# Patient Record
Sex: FEMALE | URBAN - METROPOLITAN AREA
[De-identification: names, ages, dates, MRNs, and addresses within clinical notes are randomized per-mention and may not be internally consistent; named-entity substitution may affect disease eponyms.]

---

## 2022-11-14 ENCOUNTER — TRANSCRIBE ORDER (OUTPATIENT)
Dept: SCHEDULING | Age: 57
End: 2022-11-14

## 2022-11-14 DIAGNOSIS — R07.81 RIB PAIN ON LEFT SIDE: Primary | ICD-10-CM

## 2022-11-14 DIAGNOSIS — Z90.10 H/O MASTECTOMY: ICD-10-CM

## 2023-02-06 ENCOUNTER — TELEPHONE (OUTPATIENT)
Dept: INTERNAL MEDICINE CLINIC | Age: 58
End: 2023-02-06

## 2023-02-06 NOTE — TELEPHONE ENCOUNTER
----- Message from Caitlyn Coleman sent at 2/3/2023  2:37 PM EST -----  Subject: Appointment Request    Reason for Call: New Patient/New to Provider Appointment needed: New   Patient Request Appointment    QUESTIONS    Reason for appointment request? Requested Provider unavailable - Laura Pardeep     Additional Information for Provider? Patient was recommended to Dr. Estevan Black by   her cardiologist, Zenaida Key. She wants to establish with her. Would   she take her as a new patient? When could she come in?  Please advise.  ---------------------------------------------------------------------------  --------------  Uriel KING  [phone]; OK to leave message on voicemail  ---------------------------------------------------------------------------  --------------  SCRIPT ANSWERS  COVID Screen: Ramiro Burnett

## 2023-02-08 ENCOUNTER — OFFICE VISIT (OUTPATIENT)
Dept: SURGERY | Age: 58
End: 2023-02-08

## 2023-02-08 VITALS
BODY MASS INDEX: 19.86 KG/M2 | RESPIRATION RATE: 16 BRPM | SYSTOLIC BLOOD PRESSURE: 124 MMHG | OXYGEN SATURATION: 96 % | TEMPERATURE: 98.8 F | WEIGHT: 119.2 LBS | HEART RATE: 88 BPM | DIASTOLIC BLOOD PRESSURE: 77 MMHG | HEIGHT: 65 IN

## 2023-02-08 DIAGNOSIS — K43.9 EPIGASTRIC HERNIA: Primary | ICD-10-CM

## 2023-02-08 PROCEDURE — 99202 OFFICE O/P NEW SF 15 MIN: CPT | Performed by: SURGERY

## 2023-02-08 RX ORDER — ROSUVASTATIN CALCIUM 5 MG/1
TABLET, COATED ORAL
COMMUNITY
Start: 2022-08-05

## 2023-02-08 RX ORDER — GUAIFENESIN 100 MG/5ML
81 LIQUID (ML) ORAL DAILY
COMMUNITY

## 2023-02-08 RX ORDER — PAROXETINE 10 MG/1
TABLET, FILM COATED ORAL
COMMUNITY
Start: 2022-12-31

## 2023-02-08 NOTE — LETTER
2/8/2023    Patient: Eileen Carlton   YOB: 1965   Date of Visit: 2/8/2023     Dg Lawton MD  [address]  [address] [facility] [address]  Via Fax: [phone]    Dear Dg Lawton MD,      Thank you for referring Ms. Bebeto Barrios to [facility] for evaluation. My notes for this consultation are attached. If you have questions, please do not hesitate to call me. I look forward to following your patient along with you.       Sincerely,    Ferdinand Ormond, MD

## 2023-02-08 NOTE — PROGRESS NOTES
Identified pt with two pt identifiers (name and ). Reviewed chart in preparation for visit and have obtained necessary documentation. Kati Landin is a 56 y.o. female  Chief Complaint   Patient presents with    New Patient     Referred by Cardiologist.     Incisional Hernia     Visit Vitals  /77 (BP 1 Location: Right upper arm, BP Patient Position: Sitting, BP Cuff Size: Adult)   Pulse 88   Temp 98.8 °F (37.1 °C) (Oral)   Resp 16   Ht 5' 5\" (1.651 m)   Wt 119 lb 3.2 oz (54.1 kg)   SpO2 96%   BMI 19.84 kg/m²       1. Have you been to the ER, urgent care clinic since your last visit? Hospitalized since your last visit? No    2. Have you seen or consulted any other health care providers outside of the [address] [place] [address] since your last visit? Include any pap smears or colon screening.  Yes

## 2023-02-08 NOTE — PROGRESS NOTES
Surgery Consult    Subjective:      Cristina Arce is a 56 y.o.  female who was referred for evaluation of an epigastric hernia. She has a long history of abdominal problems including a hysterectomy and oophorectomy followed by a smal bowel obstruction treated medically. At the time of the hysterectomy she had a small umbilical hernia repaired as well. .  The epigastric hernia is rarely symptomatic, and even then is minimal    Patient Active Problem List    Diagnosis Date Noted    Epigastric hernia 02/08/2023     Past Medical History:   Diagnosis Date    Arrhythmia     CAD (coronary artery disease)     Epigastric hernia 2/8/2023    Stroke [facility])       Past Surgical History:   Procedure Laterality Date    HX HERNIA REPAIR  62/3616    Umbilical hernia Sandy Umana Peeling HYSTERECTOMY  01/2022    Sandy Durant    HX MASTECTOMY  01/2020    Left Breast Dr. Amelia Martinez    HX OOPHORECTOMY  01/2022    Sandy Gomez Balloon  09/2010    Heart Closure PFO ([facility] Dr. Lorene Sal)      Social History     Tobacco Use    Smoking status: Never    Smokeless tobacco: Never   Substance Use Topics    Alcohol use: Not on file      Family History   Problem Relation Age of Onset    Cancer Mother         Ovarian    Cancer Father         Prostate    Heart Disease Father     Heart Disease Brother       Current Outpatient Medications   Medication Sig    aspirin 81 mg chewable tablet Take 81 mg by mouth daily. rosuvastatin (CRESTOR) 5 mg tablet rosuvastatin 5 mg tablet   TAKE 1/2 TAB BY MOUTH ON MONDAYS,WEDNESDAYS AND FRIDAYS    PARoxetine (PAXIL) 10 mg tablet TAKE 1/2 TABLET DAILY BY MOUTH     No current facility-administered medications for this visit. No Known Allergies     Review of Systems:    A comprehensive review of systems was negative except for that written in the History of Present Illness.     Objective: @hospitals(8:)@    [id number]    Physical Exam:  GENERAL: alert, cooperative, no distress, appears stated age, ABDOMEN: soft. Palpable minimally tender small epigastric hernia. Easily reducible. Labs: No results found for this or any previous visit (from the past 24 hour(s)). Data Review:  none    Assessment:     Epigastric hernia,     Plan:     I suggested leaving it alone unless or until it enlarges or becomes painful.  She and her  agree with this plan

## 2023-06-23 ENCOUNTER — TELEPHONE (OUTPATIENT)
Age: 58
End: 2023-06-23

## 2023-07-28 ENCOUNTER — OFFICE VISIT (OUTPATIENT)
Age: 58
End: 2023-07-28

## 2023-07-28 VITALS
DIASTOLIC BLOOD PRESSURE: 60 MMHG | HEIGHT: 65 IN | BODY MASS INDEX: 20.23 KG/M2 | HEART RATE: 89 BPM | WEIGHT: 121.4 LBS | OXYGEN SATURATION: 96 % | SYSTOLIC BLOOD PRESSURE: 100 MMHG

## 2023-07-28 DIAGNOSIS — R93.1 ELEVATED CORONARY ARTERY CALCIUM SCORE: ICD-10-CM

## 2023-07-28 DIAGNOSIS — E78.5 HYPERLIPIDEMIA, UNSPECIFIED HYPERLIPIDEMIA TYPE: ICD-10-CM

## 2023-07-28 DIAGNOSIS — Z71.89 CARDIAC RISK COUNSELING: ICD-10-CM

## 2023-07-28 DIAGNOSIS — M81.6 LOCALIZED OSTEOPOROSIS, UNSPECIFIED PATHOLOGICAL FRACTURE PRESENCE: ICD-10-CM

## 2023-07-28 DIAGNOSIS — Z87.74 S/P PATENT FORAMEN OVALE CLOSURE: ICD-10-CM

## 2023-07-28 DIAGNOSIS — Z76.89 ENCOUNTER TO ESTABLISH CARE: Primary | ICD-10-CM

## 2023-07-28 ASSESSMENT — PATIENT HEALTH QUESTIONNAIRE - PHQ9
SUM OF ALL RESPONSES TO PHQ QUESTIONS 1-9: 0
2. FEELING DOWN, DEPRESSED OR HOPELESS: 0
SUM OF ALL RESPONSES TO PHQ9 QUESTIONS 1 & 2: 0
1. LITTLE INTEREST OR PLEASURE IN DOING THINGS: 0

## 2023-07-28 ASSESSMENT — ENCOUNTER SYMPTOMS
WHEEZING: 0
CHEST TIGHTNESS: 0
SHORTNESS OF BREATH: 0

## 2023-07-29 LAB — LPA SERPL-SCNC: 40.6 NMOL/L

## 2023-08-08 ENCOUNTER — TELEPHONE (OUTPATIENT)
Age: 58
End: 2023-08-08

## 2023-08-08 NOTE — TELEPHONE ENCOUNTER
----- Message from Nya Mckenzie MD sent at 7/30/2023  8:55 AM EDT -----  Hi! Can you send her a Mac message that her lpl (a) was normal.    Thank u.

## 2023-10-02 ENCOUNTER — OFFICE VISIT (OUTPATIENT)
Age: 58
End: 2023-10-02
Payer: COMMERCIAL

## 2023-10-02 VITALS
WEIGHT: 125.4 LBS | HEIGHT: 64 IN | BODY MASS INDEX: 21.41 KG/M2 | TEMPERATURE: 97.9 F | HEART RATE: 91 BPM | OXYGEN SATURATION: 97 % | DIASTOLIC BLOOD PRESSURE: 74 MMHG | RESPIRATION RATE: 15 BRPM | SYSTOLIC BLOOD PRESSURE: 110 MMHG

## 2023-10-02 DIAGNOSIS — M81.0 OSTEOPOROSIS, UNSPECIFIED OSTEOPOROSIS TYPE, UNSPECIFIED PATHOLOGICAL FRACTURE PRESENCE: ICD-10-CM

## 2023-10-02 DIAGNOSIS — E03.9 ACQUIRED HYPOTHYROIDISM: ICD-10-CM

## 2023-10-02 DIAGNOSIS — K43.9 EPIGASTRIC HERNIA: ICD-10-CM

## 2023-10-02 DIAGNOSIS — I47.10 SUPRAVENTRICULAR TACHYCARDIA: ICD-10-CM

## 2023-10-02 DIAGNOSIS — I35.8 AORTIC VALVE SCLEROSIS: ICD-10-CM

## 2023-10-02 DIAGNOSIS — Z86.73 HISTORY OF CVA (CEREBROVASCULAR ACCIDENT): ICD-10-CM

## 2023-10-02 DIAGNOSIS — M17.11 ARTHRITIS OF RIGHT KNEE: ICD-10-CM

## 2023-10-02 DIAGNOSIS — E78.00 PURE HYPERCHOLESTEROLEMIA: ICD-10-CM

## 2023-10-02 DIAGNOSIS — D05.12 INTRADUCTAL CARCINOMA IN SITU OF LEFT BREAST: ICD-10-CM

## 2023-10-02 PROBLEM — R93.1 ELEVATED CORONARY ARTERY CALCIUM SCORE: Status: RESOLVED | Noted: 2023-07-28 | Resolved: 2023-10-02

## 2023-10-02 PROBLEM — N32.81 OAB (OVERACTIVE BLADDER): Status: ACTIVE | Noted: 2022-11-07

## 2023-10-02 PROBLEM — F41.9 ANXIETY: Status: ACTIVE | Noted: 2023-09-05

## 2023-10-02 PROBLEM — E78.5 HYPERLIPIDEMIA: Status: ACTIVE | Noted: 2021-03-29

## 2023-10-02 PROBLEM — Z76.89 ENCOUNTER TO ESTABLISH CARE: Status: RESOLVED | Noted: 2023-07-28 | Resolved: 2023-10-02

## 2023-10-02 PROBLEM — K58.9 IBS (IRRITABLE COLON SYNDROME): Status: ACTIVE | Noted: 2023-09-05

## 2023-10-02 PROBLEM — R12 HEARTBURN: Status: ACTIVE | Noted: 2023-09-05

## 2023-10-02 PROCEDURE — 99204 OFFICE O/P NEW MOD 45 MIN: CPT | Performed by: INTERNAL MEDICINE

## 2023-10-02 RX ORDER — SPIRONOLACTONE 50 MG/1
100 TABLET, FILM COATED ORAL NIGHTLY
COMMUNITY
Start: 2023-08-11 | End: 2023-10-02

## 2023-10-02 RX ORDER — FAMOTIDINE 20 MG/1
TABLET, FILM COATED ORAL
COMMUNITY
Start: 2023-09-26 | End: 2023-10-02

## 2023-10-02 RX ORDER — ROMOSOZUMAB-AQQG 105 MG/1.17ML
INJECTION, SOLUTION SUBCUTANEOUS
COMMUNITY
Start: 2023-09-19 | End: 2023-10-02

## 2023-10-02 RX ORDER — ASCORBIC ACID 500 MG
500 TABLET ORAL DAILY
COMMUNITY

## 2023-10-02 RX ORDER — OMEPRAZOLE 40 MG/1
CAPSULE, DELAYED RELEASE ORAL
COMMUNITY
Start: 2023-09-07 | End: 2023-10-02

## 2023-10-02 RX ORDER — ALPRAZOLAM 0.25 MG/1
TABLET ORAL PRN
COMMUNITY
Start: 2023-09-27

## 2023-10-02 SDOH — ECONOMIC STABILITY: FOOD INSECURITY: WITHIN THE PAST 12 MONTHS, YOU WORRIED THAT YOUR FOOD WOULD RUN OUT BEFORE YOU GOT MONEY TO BUY MORE.: NEVER TRUE

## 2023-10-02 SDOH — ECONOMIC STABILITY: INCOME INSECURITY: HOW HARD IS IT FOR YOU TO PAY FOR THE VERY BASICS LIKE FOOD, HOUSING, MEDICAL CARE, AND HEATING?: NOT HARD AT ALL

## 2023-10-02 SDOH — ECONOMIC STABILITY: FOOD INSECURITY: WITHIN THE PAST 12 MONTHS, THE FOOD YOU BOUGHT JUST DIDN'T LAST AND YOU DIDN'T HAVE MONEY TO GET MORE.: NEVER TRUE

## 2023-10-02 SDOH — ECONOMIC STABILITY: HOUSING INSECURITY
IN THE LAST 12 MONTHS, WAS THERE A TIME WHEN YOU DID NOT HAVE A STEADY PLACE TO SLEEP OR SLEPT IN A SHELTER (INCLUDING NOW)?: NO

## 2023-10-02 ASSESSMENT — ENCOUNTER SYMPTOMS
COUGH: 0
SHORTNESS OF BREATH: 0
ABDOMINAL PAIN: 0

## 2023-10-02 NOTE — PROGRESS NOTES
10/2/2023    Kelli Bray Mistr 1965 is a 66y.o. year old female new patient,   here for evaluation of the following chief complaint(s):  Chief Complaint   Patient presents with    New Patient           ASSESSMENT/PLAN:  Below is the assessment and plan developed based on review of pertinent history, physical exam, labs, studies, and medications. 1. Pure hypercholesterolemia  Assessment & Plan:  CAC 10, Nov 2020  Has been on rosuvastatin, 1/2 tab weekly, when she tried 3 days per week she did get muscle aches. Discussed trial of coq10 and see if she can increase to twice weekly. Consider statin alternatives in the future, she has been prescribed zetia and thinking about adding  Last LDL 110s this summer  2. Osteoporosis, unspecified osteoporosis type, unspecified pathological fracture presence  Assessment & Plan:  She has met with endo, rheum (Dr Marsha Day), getting more opinions and researching options for bone building meds  3. Aortic valve sclerosis  Assessment & Plan:   BICUSPID AORTIC VALVE  Annual monitoring with [facility] cardio  4. Acquired hypothyroidism  Assessment & Plan:   Has been subclinical at times, gets regular monitoring  5. History of CVA (cerebrovascular accident)  Assessment & Plan:  2011, subsequently discovered PFO and had repair  6. Intraductal carcinoma in situ of left breast  Assessment & Plan:   S/p L mastectomy with reconstruction, no chemo, no XRT, Dx 1/2020  Emmanuel Pemberton following  Gets annual breast MRI  7. Arthritis of right knee  Assessment & Plan:  Has had injections with ortho Dr Shayan Neil  8. Supraventricular tachycardia  Assessment & Plan:   Brief run 2022 after surgery, had subsequent cardiac monitoring that was reassuring  9. Epigastric hernia  Assessment & Plan:  Post op after hysterectomy and umbilical hernia repair, 2023         Return in about 4 months (around 2/2/2024) for annual physical,or sooner as needed.        SUBJECTIVE/OBJECTIVE:  New patient to me, here to establish

## 2023-10-02 NOTE — ASSESSMENT & PLAN NOTE
CAC 10, Nov 2020  Has been on rosuvastatin, 1/2 tab weekly, when she tried 3 days per week she did get muscle aches. Discussed trial of coq10 and see if she can increase to twice weekly.   Consider statin alternatives in the future, she has been prescribed zetia and thinking about adding  Last LDL 110s this summer

## 2023-10-02 NOTE — ASSESSMENT & PLAN NOTE
She has met with endo, rheum (Dr Jeralyn Blizzard), getting more opinions and researching options for bone building meds

## 2023-10-02 NOTE — ASSESSMENT & PLAN NOTE
S/p L mastectomy with reconstruction, no chemo, no XRT, Dx 1/2020  Star Jarrett following  Gets annual breast MRI

## 2023-11-17 ENCOUNTER — OFFICE VISIT (OUTPATIENT)
Age: 58
End: 2023-11-17
Payer: COMMERCIAL

## 2023-11-17 VITALS
SYSTOLIC BLOOD PRESSURE: 118 MMHG | HEIGHT: 64 IN | BODY MASS INDEX: 21.68 KG/M2 | RESPIRATION RATE: 15 BRPM | WEIGHT: 127 LBS | TEMPERATURE: 96.8 F | DIASTOLIC BLOOD PRESSURE: 79 MMHG | OXYGEN SATURATION: 98 % | HEART RATE: 94 BPM

## 2023-11-17 DIAGNOSIS — M79.10 MYALGIA: ICD-10-CM

## 2023-11-17 DIAGNOSIS — M79.10 MYALGIA: Primary | ICD-10-CM

## 2023-11-17 PROCEDURE — 99213 OFFICE O/P EST LOW 20 MIN: CPT | Performed by: NURSE PRACTITIONER

## 2023-11-17 NOTE — PROGRESS NOTES
Adia Cross (:  1965) is a 66 y.o. female,here for evaluation of the following chief complaint(s):  Leg Pain and Arm Pain        SUBJECTIVE/OBJECTIVE:    HPI:Patient presents for myalgias. Patient stopped crestor on  due to pain of muscles in arms(triceps) and back of thighs. Pain of arms worsens when she is leaning back with pressure on her elbows. She does not notice improvement over the past 1.5 months since stopping crestor. She had a previous reaction to crestor and stopped it in the past, but muscle aches improved quickly. No weakness. She is seeing rheumatology, Dr. Kaylee Johnson, and noted to have increased RF and creatine kinase. No fevers. No swelling. Review of Systems   Musculoskeletal:  Positive for myalgias. Physical Exam  Constitutional:       Appearance: Normal appearance. Musculoskeletal:         General: Swelling and tenderness (pain of bilateral tricep when muscle engaged) present. Normal range of motion. Skin:     General: Skin is warm and dry. Neurological:      General: No focal deficit present. Mental Status: She is alert and oriented to person, place, and time. Motor: No weakness. Coordination: Coordination normal.      Gait: Gait normal.   Psychiatric:         Mood and Affect: Mood normal.         Behavior: Behavior normal.          ASSESSMENT/PLAN:  1. Myalgia  -     Anti-Nuclear Ab by IFA; Future  -     Lyme Disease Total Antibody With Reflex to Immunoassay;  Future  Labs ordered  Follow-up with Rheumatology  Continue to hold statin    --ANA ROSA Orozco - NP

## 2023-11-22 ENCOUNTER — TELEPHONE (OUTPATIENT)
Age: 58
End: 2023-11-22

## 2023-11-22 NOTE — TELEPHONE ENCOUNTER
Reason for call:  TC from pt. Pt id verified. Pt inquiring if lab work, from 11/17/23, has been resulted and if not, when they might be resulted.      Is this a new problem: Yes    Date of last appointment:  11/17/2023     Can we respond via [facility]: Yes    Best call back number: [phone]

## 2023-11-22 NOTE — TELEPHONE ENCOUNTER
Verified patient identity with two identifiers. Spoke with patient by phone, informed of José Romero states 5-7 days normally but with test drawn on a Friday and Holiday in between will take longer. Hopefully by Monday. Patient verbalized understanding.

## 2023-12-01 ENCOUNTER — TELEPHONE (OUTPATIENT)
Age: 58
End: 2023-12-01

## 2023-12-01 NOTE — TELEPHONE ENCOUNTER
Reason for call:  Second call. TC from pt. Pt id verified. Pt calling to get lab results.      Is this a new problem: No    Date of last appointment:  11/17/2023     Can we respond via [facility]: No    Best call back number: [phone]

## 2023-12-01 NOTE — TELEPHONE ENCOUNTER
----- Message from Mayra Gonzalez sent at 12/1/2023 10:02 AM EST -----  Subject: Results Request    QUESTIONS  Results: lab work; Ordered by: Karla Wells   Date Performed: 2023-11-17  ---------------------------------------------------------------------------  --------------  [address]    [phone]; OK to leave message on voicemail  ---------------------------------------------------------------------------  --------------

## 2023-12-04 ENCOUNTER — TELEPHONE (OUTPATIENT)
Age: 58
End: 2023-12-04

## 2023-12-04 LAB
ANA SER QL IF: POSITIVE
ANA SPECKLED TITR SER: ABNORMAL {TITER}
B BURGDOR IGG+IGM SER QL IA: NEGATIVE
LABORATORY COMMENT REPORT: ABNORMAL

## 2023-12-04 NOTE — TELEPHONE ENCOUNTER
Patient would like recent lab results. Requesting communication via [address] [place] [address]. Will forward to MD in NP absence. Patient seen by rheumatology today and does not have a clear understanding of results.

## 2023-12-04 NOTE — TELEPHONE ENCOUNTER
Recent labs ordered by muriel are available in [facility] for her review, lyme is negative. CYNDI is elevated which is a non-specific marker associated with some autoimmune conditions, the next step would be seeing the rheumatologist who would order additional tests to try to find out what's going on. I'm not sure if the issue was that we didn't send the results to the rheumatologist in time for her visit or what she is exactly asking for assistance with?

## 2023-12-04 NOTE — TELEPHONE ENCOUNTER
Notified patient per MD note. Patient is seen by Dr. Timur Camargo, additional testing has been ordered.

## 2023-12-04 NOTE — TELEPHONE ENCOUNTER
----- Message from Sonali Kimbrough sent at 12/1/2023  4:01 PM EST -----  Subject: Message to Provider    QUESTIONS  Information for Provider? Patient said that one of the Lab tests that she   took on 11/17/23 is for her rheumatologist and she need the results before   her 10am doctor's visit.  ---------------------------------------------------------------------------  --------------  Alejandra Pickett [facility]  [phone]; OK to leave message on voicemail  ---------------------------------------------------------------------------  --------------  SCRIPT ANSWERS  Relationship to Patient?  Self

## 2023-12-05 ENCOUNTER — OFFICE VISIT (OUTPATIENT)
Age: 58
End: 2023-12-05
Payer: COMMERCIAL

## 2023-12-05 ENCOUNTER — TELEPHONE (OUTPATIENT)
Age: 58
End: 2023-12-05

## 2023-12-05 VITALS
BODY MASS INDEX: 20.99 KG/M2 | SYSTOLIC BLOOD PRESSURE: 110 MMHG | OXYGEN SATURATION: 96 % | HEIGHT: 65 IN | DIASTOLIC BLOOD PRESSURE: 80 MMHG | HEART RATE: 90 BPM | WEIGHT: 126 LBS

## 2023-12-05 DIAGNOSIS — Z87.74 S/P PATENT FORAMEN OVALE CLOSURE: Primary | ICD-10-CM

## 2023-12-05 PROCEDURE — 99214 OFFICE O/P EST MOD 30 MIN: CPT | Performed by: INTERNAL MEDICINE

## 2023-12-05 RX ORDER — NALTREXONE HYDROCHLORIDE 50 MG/1
TABLET, FILM COATED ORAL
COMMUNITY
Start: 2023-12-04

## 2023-12-05 ASSESSMENT — ENCOUNTER SYMPTOMS
CHEST TIGHTNESS: 0
SHORTNESS OF BREATH: 0
WHEEZING: 0

## 2023-12-05 NOTE — TELEPHONE ENCOUNTER
Called pt, ID x 2. Advised per NP's note regarding recent lab results and recommendations. Pt verbalized understanding.

## 2023-12-05 NOTE — PROGRESS NOTES
Per Dr. Ramiro Hawkins- follow up one year.
Sophie Swanson is a 66 y.o. female    had concerns including Hyperlipidemia, SVT, and Other (Hx CVA). Vitals:    12/05/23 1008   BP: 110/80   Site: Left Upper Arm   Position: Sitting   Pulse: 90   SpO2: 96%   Weight: 57.2 kg (126 lb)   Height: 1.651 m (5' 5\")        Chest pain NO    SOB NO    Dizziness NO    Swelling NO BUT SOME PAIN IN THE BACK OF LEGS AND ARMS     Palpitations YES    Refills NO    Covid Vaccinated YES      1. Have you been to the ER, urgent care clinic since your last visit? Hospitalized since your last visit? NO    2. Have you seen or consulted any other health care providers outside of the [address] [place] [address] since your last visit? Include any pap smears or colon screening.  NO
normal.      Breath sounds: Normal breath sounds. Musculoskeletal:         General: No swelling. Normal range of motion. Cervical back: Normal range of motion and neck supple. Right lower leg: No edema. Left lower leg: No edema. Skin:     General: Skin is warm and dry. Capillary Refill: Capillary refill takes more than 3 seconds. Neurological:      General: No focal deficit present. Mental Status: She is alert and oriented to person, place, and time. Psychiatric:         Mood and Affect: Mood normal.          No results found for: \"CHOL\", \"CHOLX\", \"CHLST\", \"CHOLV\", \"185327\", \"HDL\", \"HDLC\", \"LDL\", \"LDLC\", \"TGLX\", \"TRIGL\"    No results found for: \"WBC\", \"WBCLT\", \"HGBPOC\", \"HGB\", \"HGBP\", \"HCTPOC\", \"HCT\", \"PHCT\", \"PLT\", \"MCV\"     No results found for: \"CHOL\", \"CHOLPOCT\", \"CHOLX\", \"CHLST\", \"CHOLV\", \"HDL\", \"HDLC\", \"LDL\", \"LDLC\", \"TGLX\", \"TRIGL\"     Na Alvarez was seen today for hyperlipidemia, svt and other. Diagnoses and all orders for this visit:    S/P patent foramen ovale closure           No follow-ups on file. Electronically signed by Yousif Rivas MD on 12/5/2023 at 10:57 AM           [address] 200  [facility], [address] [address] [phone] (F)    [address]  [phone] (P)  [phone] (F)    ATTENTION:   This medical record was transcribed using an electronic medical records/speech recognition system. Although proofread, it may and can contain electronic, spelling and other errors. Corrections may be executed at a later time. Please feel free to contact us for any clarifications as needed.

## 2024-03-26 ENCOUNTER — OFFICE VISIT (OUTPATIENT)
Age: 59
End: 2024-03-26
Payer: COMMERCIAL

## 2024-03-26 VITALS
HEART RATE: 86 BPM | DIASTOLIC BLOOD PRESSURE: 76 MMHG | HEIGHT: 65 IN | SYSTOLIC BLOOD PRESSURE: 116 MMHG | WEIGHT: 129.8 LBS | BODY MASS INDEX: 21.63 KG/M2

## 2024-03-26 DIAGNOSIS — M81.0 AGE-RELATED OSTEOPOROSIS WITHOUT CURRENT PATHOLOGICAL FRACTURE: Primary | ICD-10-CM

## 2024-03-26 PROCEDURE — 99204 OFFICE O/P NEW MOD 45 MIN: CPT | Performed by: INTERNAL MEDICINE

## 2024-03-26 RX ORDER — TERIPARATIDE 250 UG/ML
20 INJECTION, SOLUTION SUBCUTANEOUS DAILY
COMMUNITY
Start: 2024-01-29
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
## 2024-03-26 NOTE — PATIENT INSTRUCTIONS
1) I agree with a 2 year course of forteo and recommend you continue to see Dr. Naik over the next 2 years and I'm happy to see you back in February of 2026 so I will send myself a reminder in August of 2025 to reach out to you to make an appointment for you in 2/26.    2) Keep taking vitamin D and multivitamin daily and if you would like to add 500 mg of oral calcium in the form of one TUMS daily then this is reasonable.    3) Try to get back into walking 10-15 minutes a day 2-3 times a week and work up to 30 minutes 5 times a week.  This does not have to be done altogether but can be split up throughout the day.

## 2024-06-05 ENCOUNTER — OFFICE VISIT (OUTPATIENT)
Age: 59
End: 2024-06-05
Payer: COMMERCIAL

## 2024-06-05 VITALS
HEART RATE: 90 BPM | BODY MASS INDEX: 22.3 KG/M2 | HEIGHT: 64 IN | RESPIRATION RATE: 18 BRPM | OXYGEN SATURATION: 97 % | DIASTOLIC BLOOD PRESSURE: 70 MMHG | WEIGHT: 130.6 LBS | SYSTOLIC BLOOD PRESSURE: 116 MMHG | TEMPERATURE: 97.9 F

## 2024-06-05 DIAGNOSIS — I47.10 SUPRAVENTRICULAR TACHYCARDIA (HCC): ICD-10-CM

## 2024-06-05 DIAGNOSIS — E55.9 VITAMIN D DEFICIENCY: ICD-10-CM

## 2024-06-05 DIAGNOSIS — Z00.00 ROUTINE PHYSICAL EXAMINATION: Primary | ICD-10-CM

## 2024-06-05 DIAGNOSIS — E03.9 ACQUIRED HYPOTHYROIDISM: ICD-10-CM

## 2024-06-05 DIAGNOSIS — M81.0 OSTEOPOROSIS, UNSPECIFIED OSTEOPOROSIS TYPE, UNSPECIFIED PATHOLOGICAL FRACTURE PRESENCE: ICD-10-CM

## 2024-06-05 DIAGNOSIS — I35.8 AORTIC VALVE SCLEROSIS: ICD-10-CM

## 2024-06-05 DIAGNOSIS — Z11.59 NEED FOR HEPATITIS C SCREENING TEST: ICD-10-CM

## 2024-06-05 DIAGNOSIS — F41.9 ANXIETY: ICD-10-CM

## 2024-06-05 DIAGNOSIS — E78.00 PURE HYPERCHOLESTEROLEMIA: ICD-10-CM

## 2024-06-05 DIAGNOSIS — D05.12 INTRADUCTAL CARCINOMA IN SITU OF LEFT BREAST: ICD-10-CM

## 2024-06-05 PROCEDURE — 99396 PREV VISIT EST AGE 40-64: CPT | Performed by: INTERNAL MEDICINE

## 2024-06-05 ASSESSMENT — PATIENT HEALTH QUESTIONNAIRE - PHQ9
1. LITTLE INTEREST OR PLEASURE IN DOING THINGS: NOT AT ALL
SUM OF ALL RESPONSES TO PHQ QUESTIONS 1-9: 0
2. FEELING DOWN, DEPRESSED OR HOPELESS: NOT AT ALL
SUM OF ALL RESPONSES TO PHQ QUESTIONS 1-9: 0
SUM OF ALL RESPONSES TO PHQ9 QUESTIONS 1 & 2: 0

## 2024-06-05 ASSESSMENT — ENCOUNTER SYMPTOMS
SHORTNESS OF BREATH: 0
CONSTIPATION: 0
DIARRHEA: 0
VOMITING: 0
NAUSEA: 0
COUGH: 0
BACK PAIN: 1
ABDOMINAL PAIN: 0
EYES NEGATIVE: 1

## 2024-06-05 NOTE — PROGRESS NOTES
Chief Complaint   Patient presents with    Annual Exam    Arm Pain     Back of left upper arm pain    Leg Pain     Blood pressure 116/70, pulse 90, temperature 97.9 °F (36.6 °C), temperature source Oral, resp. rate 18, height 1.631 m (5' 4.2\"), weight 59.2 kg (130 lb 9.6 oz), SpO2 97 %.     within functional limits

## 2024-06-05 NOTE — ASSESSMENT & PLAN NOTE
S/p L mastectomy with reconstruction, no chemo, no XRT, Dx 1/2020  Svetlana Amaya following as well as breast surgery group [facility]  Gets annual breast MRI alternating with mammos

## 2024-06-05 NOTE — PROGRESS NOTES
6/5/2024    Perla Cross is a 58 y.o. female who was seen in clinic today for a full physical.             Assessment & Plan:   Below is the assessment and plan developed based on review of pertinent history, physical exam, labs, studies, and medications.    1. Routine physical examination  -     Lipid Panel  -     Hemoglobin A1C  -     TSH  -     Comprehensive Metabolic Panel  -     CBC with Auto Differential  -     Vitamin D 25 Hydroxy  2. Osteoporosis, unspecified osteoporosis type, unspecified pathological fracture presence  Assessment & Plan:  Has consulted with rheum and endo  Had secondary testing 6/2023 that was reassuring  Started Forteo Feb 2024, plan for 2 year therapy  Orders:  -     Vitamin D 25 Hydroxy  3. Acquired hypothyroidism  Assessment & Plan:  Stable, continue regular monitoring  4. Pure hypercholesterolemia  Assessment & Plan:  Will get updated lipid panel  Didn't tolerate statins  Consider zetia  5. Supraventricular tachycardia (HCC)  Assessment & Plan:   Brief run 2022 after surgery, had subsequent cardiac monitoring that was reassuring  6. Intraductal carcinoma in situ of left breast  Assessment & Plan:   S/p L mastectomy with reconstruction, no chemo, no XRT, Dx 1/2020  Svetlana Amaya [facility] as well as breast surgery group [facility]  Gets annual breast MRI alternating with mammos  7. Anxiety  Assessment & Plan:  Had tried cymbalta but felt more fatigued  8. Aortic valve sclerosis  Assessment & Plan:  Bicuspid aortic valve  Annual monitoring with [facility] cardio  9. Vitamin D deficiency  -     Vitamin D 25 Hydroxy  10. Need for hepatitis C screening test  -     Hepatitis C Ab, Rflx to Qt by PCR     Planning for PT soon    Return for annual physical,or sooner as needed.      Perla is here today for a full physical.      Diet and exercise habits: Diet overall balanced, exercise somewhat limited due to knee pain, leg pains      Depression Screen:  PHQ-9 Total Score: 0 (6/5/2024  1:55 PM)       Health

## 2024-06-05 NOTE — ASSESSMENT & PLAN NOTE
Has consulted with rheum and endo  Had secondary testing 6/2023 that was reassuring  Started Forteo Feb 2024, plan for 2 year therapy

## 2024-06-14 ENCOUNTER — TELEPHONE (OUTPATIENT)
Age: 59
End: 2024-06-14

## 2024-06-14 ENCOUNTER — PATIENT MESSAGE (OUTPATIENT)
Age: 59
End: 2024-06-14

## 2024-06-14 DIAGNOSIS — E03.9 HYPOTHYROIDISM (ACQUIRED): ICD-10-CM

## 2024-06-14 DIAGNOSIS — E78.00 PURE HYPERCHOLESTEROLEMIA: Primary | ICD-10-CM

## 2024-06-14 LAB
25(OH)D3+25(OH)D2 SERPL-MCNC: 41.1 NG/ML (ref 30–100)
ALBUMIN SERPL-MCNC: 4.6 G/DL (ref 3.8–4.9)
ALBUMIN/GLOB SERPL: 1.9 {RATIO} (ref 1.2–2.2)
ALP SERPL-CCNC: 104 IU/L (ref 44–121)
ALT SERPL-CCNC: 14 IU/L (ref 0–32)
AST SERPL-CCNC: 20 IU/L (ref 0–40)
BASOPHILS # BLD AUTO: 0.1 X10E3/UL (ref 0–0.2)
BASOPHILS NFR BLD AUTO: 2 %
BILIRUB SERPL-MCNC: 0.8 MG/DL (ref 0–1.2)
BUN SERPL-MCNC: 20 MG/DL (ref 6–24)
BUN/CREAT SERPL: 32 (ref 9–23)
CALCIUM SERPL-MCNC: 9.8 MG/DL (ref 8.7–10.2)
CHLORIDE SERPL-SCNC: 101 MMOL/L (ref 96–106)
CHOLEST SERPL-MCNC: 238 MG/DL (ref 100–199)
CO2 SERPL-SCNC: 24 MMOL/L (ref 20–29)
CREAT SERPL-MCNC: 0.63 MG/DL (ref 0.57–1)
EGFRCR SERPLBLD CKD-EPI 2021: 104 ML/MIN/1.73
EOSINOPHIL # BLD AUTO: 0.2 X10E3/UL (ref 0–0.4)
EOSINOPHIL NFR BLD AUTO: 3 %
ERYTHROCYTE [DISTWIDTH] IN BLOOD BY AUTOMATED COUNT: 12.3 % (ref 11.7–15.4)
GLOBULIN SER CALC-MCNC: 2.4 G/DL (ref 1.5–4.5)
GLUCOSE SERPL-MCNC: 93 MG/DL (ref 70–99)
HBA1C MFR BLD: 5.6 % (ref 4.8–5.6)
HCT VFR BLD AUTO: 41 % (ref 34–46.6)
HCV AB SERPL QL IA: NORMAL
HCV IGG SERPL QL IA: NON REACTIVE
HDLC SERPL-MCNC: 84 MG/DL
HGB BLD-MCNC: 13.5 G/DL (ref 11.1–15.9)
IMM GRANULOCYTES # BLD AUTO: 0 X10E3/UL (ref 0–0.1)
IMM GRANULOCYTES NFR BLD AUTO: 0 %
LDLC SERPL CALC-MCNC: 145 MG/DL (ref 0–99)
LYMPHOCYTES # BLD AUTO: 2.2 X10E3/UL (ref 0.7–3.1)
LYMPHOCYTES NFR BLD AUTO: 43 %
MCH RBC QN AUTO: 31 PG (ref 26.6–33)
MCHC RBC AUTO-ENTMCNC: 32.9 G/DL (ref 31.5–35.7)
MCV RBC AUTO: 94 FL (ref 79–97)
MONOCYTES # BLD AUTO: 0.5 X10E3/UL (ref 0.1–0.9)
MONOCYTES NFR BLD AUTO: 10 %
NEUTROPHILS # BLD AUTO: 2.2 X10E3/UL (ref 1.4–7)
NEUTROPHILS NFR BLD AUTO: 42 %
PLATELET # BLD AUTO: 221 X10E3/UL (ref 150–450)
POTASSIUM SERPL-SCNC: 4.2 MMOL/L (ref 3.5–5.2)
PROT SERPL-MCNC: 7 G/DL (ref 6–8.5)
RBC # BLD AUTO: 4.35 X10E6/UL (ref 3.77–5.28)
SODIUM SERPL-SCNC: 138 MMOL/L (ref 134–144)
TRIGL SERPL-MCNC: 53 MG/DL (ref 0–149)
TSH SERPL DL<=0.005 MIU/L-ACNC: 6.21 UIU/ML (ref 0.45–4.5)
VLDLC SERPL CALC-MCNC: 9 MG/DL (ref 5–40)
WBC # BLD AUTO: 5.1 X10E3/UL (ref 3.4–10.8)

## 2024-06-14 RX ORDER — LEVOTHYROXINE SODIUM 0.03 MG/1
25 TABLET ORAL DAILY
Qty: 90 TABLET | Refills: 0 | Status: SHIPPED | OUTPATIENT
Start: 2024-06-14

## 2024-06-14 NOTE — TELEPHONE ENCOUNTER
From: Perla Cheemar  To: Dr. Heather Clancy  Sent: 6/14/2024 12:38 PM EDT  Subject: question about blood work    Dr. Clancy,  Meant to include in previous note. I was curious about the Bun/creatine ratio, exactly what is that used for? Also on the Hemoglobin Alc, since the number is on the upper end of prediabetes, is that a concern?    Thank you,  Perla

## 2024-06-14 NOTE — TELEPHONE ENCOUNTER
Patient would like to discuss her labs results that she had done at [facility] Chantilly Lace on 6/6/24.    [facility] [phone]

## 2024-08-19 DIAGNOSIS — E03.9 HYPOTHYROIDISM (ACQUIRED): ICD-10-CM

## 2024-10-18 ENCOUNTER — OFFICE VISIT (OUTPATIENT)
Age: 59
End: 2024-10-18
Payer: COMMERCIAL

## 2024-10-18 VITALS — WEIGHT: 128 LBS | HEIGHT: 65 IN | BODY MASS INDEX: 21.33 KG/M2

## 2024-10-18 DIAGNOSIS — Z85.3 HISTORY OF LEFT BREAST CANCER: ICD-10-CM

## 2024-10-18 DIAGNOSIS — Z12.31 BREAST CANCER SCREENING BY MAMMOGRAM: Primary | ICD-10-CM

## 2024-10-18 PROCEDURE — 99203 OFFICE O/P NEW LOW 30 MIN: CPT | Performed by: SURGERY

## 2024-10-18 NOTE — PROGRESS NOTES
HISTORY OF PRESENT ILLNESS  Perla Cross is a 59 y.o. female     HPI NEW patient consult referred for LEFT breast pain. Has been having shoulder and breast pain on the LEFT breast. Has been going to PT and seems to be helping some but not improving how she hoped. She would like to be established so she can be followed as her original doctor is no longer in town.       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 Terrence terry    Genetic testing, negative       Family History:  Mother- ovarian dx 70 and melanoma dx 40  Father- Prostate cancer dx 70  Brother- Melanoma dx 61      Breast imaging-   MRI [facility] 11/2023, BI-RADS 2    Mammogram 6/2024 Mylene Parr, BI-RADS         Review of Systems      Physical Exam       ASSESSMENT and PLAN  {Assessment and Plan Chronic Disease:[id number]}    
Physical exam is normal with intact implant aside from some mild implant encapsulation but no pain. Stop annual MRI. Get annual mammograms and follow up. Continue PT for shoulder pain. Consider every 3 year MRI for implant integrity.     Discussed HRT for bone loss. She has had hysterectomy so can go on unopposed estrogen without increasing her new primary breast cancer risk. Regarding DCIS, although it was ER positive, it theoretically is gone with no metastatic potential so I would be comfortable with her on estrogen therapy understanding the minuscule risk of local recurrence.    F/U in 1 year. This plan was reviewed with the patient and patient agrees. All questions were answered.    Total time spent excluding procedural time was 40 minutes.    ODILIA, Sabrina Garces, am scribing for and in the presence of Carson Wheatley Jr, MD. 10/18/24/10:15 AM EDT    I, Dr. Crason Wheatley, personally performed the services described in this document as scribed by Sabrina Garces in my presence, and it is both accurate and complete.

## 2024-10-31 ENCOUNTER — CLINICAL DOCUMENTATION (OUTPATIENT)
Age: 59
End: 2024-10-31

## 2024-10-31 NOTE — PROGRESS NOTES
MRI imaging disc taken to [facility] imaging department to be loaded into chart.

## 2024-12-10 ENCOUNTER — OFFICE VISIT (OUTPATIENT)
Age: 59
End: 2024-12-10
Payer: COMMERCIAL

## 2024-12-10 VITALS
OXYGEN SATURATION: 96 % | HEIGHT: 65 IN | BODY MASS INDEX: 22.99 KG/M2 | DIASTOLIC BLOOD PRESSURE: 80 MMHG | HEART RATE: 88 BPM | SYSTOLIC BLOOD PRESSURE: 124 MMHG | WEIGHT: 138 LBS

## 2024-12-10 DIAGNOSIS — E78.5 HYPERLIPIDEMIA, UNSPECIFIED HYPERLIPIDEMIA TYPE: Primary | ICD-10-CM

## 2024-12-10 DIAGNOSIS — M81.0 OSTEOPOROSIS, UNSPECIFIED OSTEOPOROSIS TYPE, UNSPECIFIED PATHOLOGICAL FRACTURE PRESENCE: ICD-10-CM

## 2024-12-10 DIAGNOSIS — Z86.73 HISTORY OF CVA (CEREBROVASCULAR ACCIDENT): ICD-10-CM

## 2024-12-10 DIAGNOSIS — E78.5 HYPERLIPIDEMIA, UNSPECIFIED HYPERLIPIDEMIA TYPE: ICD-10-CM

## 2024-12-10 DIAGNOSIS — Z87.74 S/P PATENT FORAMEN OVALE CLOSURE: ICD-10-CM

## 2024-12-10 DIAGNOSIS — D05.12 INTRADUCTAL CARCINOMA IN SITU OF LEFT BREAST: ICD-10-CM

## 2024-12-10 DIAGNOSIS — E03.9 HYPOTHYROIDISM (ACQUIRED): ICD-10-CM

## 2024-12-10 DIAGNOSIS — R93.1 ELEVATED CORONARY ARTERY CALCIUM SCORE: ICD-10-CM

## 2024-12-10 DIAGNOSIS — Z71.89 CARDIAC RISK COUNSELING: ICD-10-CM

## 2024-12-10 PROCEDURE — 93000 ELECTROCARDIOGRAM COMPLETE: CPT | Performed by: INTERNAL MEDICINE

## 2024-12-10 PROCEDURE — 99214 OFFICE O/P EST MOD 30 MIN: CPT | Performed by: INTERNAL MEDICINE

## 2024-12-10 RX ORDER — ESTRADIOL 0.03 MG/D
1 FILM, EXTENDED RELEASE TRANSDERMAL
COMMUNITY
Start: 2024-11-25

## 2024-12-10 RX ORDER — PRAVASTATIN SODIUM 20 MG
20 TABLET ORAL DAILY
Qty: 90 TABLET | Refills: 1 | Status: SHIPPED | OUTPATIENT
Start: 2024-12-10

## 2024-12-10 ASSESSMENT — ENCOUNTER SYMPTOMS
WHEEZING: 0
SHORTNESS OF BREATH: 0
CHEST TIGHTNESS: 0
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
## 2024-12-10 NOTE — PROGRESS NOTES
Chief Complaint   Patient presents with    S/P patent foramen ovale closure     Vitals:    12/10/24 1101   BP: 124/80   Site: Left Upper Arm   Position: Sitting   Pulse: 88   SpO2: 96%   Weight: 62.6 kg (138 lb)   Height: 1.651 m (5' 5\")       Chest pain NO     ER, urgent care, or hospitalized outside of [facility] since your last visit?  NO     Refills NO

## 2025-01-06 SDOH — ECONOMIC STABILITY: TRANSPORTATION INSECURITY
IN THE PAST 12 MONTHS, HAS LACK OF TRANSPORTATION KEPT YOU FROM MEETINGS, WORK, OR FROM GETTING THINGS NEEDED FOR DAILY LIVING?: NO

## 2025-01-06 SDOH — ECONOMIC STABILITY: FOOD INSECURITY: WITHIN THE PAST 12 MONTHS, YOU WORRIED THAT YOUR FOOD WOULD RUN OUT BEFORE YOU GOT MONEY TO BUY MORE.: NEVER TRUE

## 2025-01-06 SDOH — ECONOMIC STABILITY: FOOD INSECURITY: WITHIN THE PAST 12 MONTHS, THE FOOD YOU BOUGHT JUST DIDN'T LAST AND YOU DIDN'T HAVE MONEY TO GET MORE.: NEVER TRUE

## 2025-01-06 SDOH — ECONOMIC STABILITY: INCOME INSECURITY: HOW HARD IS IT FOR YOU TO PAY FOR THE VERY BASICS LIKE FOOD, HOUSING, MEDICAL CARE, AND HEATING?: NOT HARD AT ALL

## 2025-01-08 ENCOUNTER — OFFICE VISIT (OUTPATIENT)
Age: 60
End: 2025-01-08
Payer: COMMERCIAL

## 2025-01-08 VITALS
OXYGEN SATURATION: 98 % | HEART RATE: 88 BPM | WEIGHT: 134.2 LBS | RESPIRATION RATE: 16 BRPM | TEMPERATURE: 98.3 F | BODY MASS INDEX: 22.36 KG/M2 | DIASTOLIC BLOOD PRESSURE: 78 MMHG | SYSTOLIC BLOOD PRESSURE: 112 MMHG | HEIGHT: 65 IN

## 2025-01-08 DIAGNOSIS — K64.9 HEMORRHOIDS, UNSPECIFIED HEMORRHOID TYPE: ICD-10-CM

## 2025-01-08 DIAGNOSIS — G89.29 CHRONIC MIDLINE THORACIC BACK PAIN: ICD-10-CM

## 2025-01-08 DIAGNOSIS — M54.6 CHRONIC MIDLINE THORACIC BACK PAIN: ICD-10-CM

## 2025-01-08 DIAGNOSIS — M81.0 OSTEOPOROSIS, UNSPECIFIED OSTEOPOROSIS TYPE, UNSPECIFIED PATHOLOGICAL FRACTURE PRESENCE: Primary | ICD-10-CM

## 2025-01-08 PROCEDURE — 99214 OFFICE O/P EST MOD 30 MIN: CPT | Performed by: INTERNAL MEDICINE

## 2025-01-08 RX ORDER — CYCLOBENZAPRINE HCL 5 MG
5 TABLET ORAL NIGHTLY
Qty: 20 TABLET | Refills: 0 | Status: SHIPPED | OUTPATIENT
Start: 2025-01-08 | End: 2025-01-28

## 2025-01-08 ASSESSMENT — ENCOUNTER SYMPTOMS
ABDOMINAL PAIN: 0
SHORTNESS OF BREATH: 0
COUGH: 0

## 2025-01-08 ASSESSMENT — PATIENT HEALTH QUESTIONNAIRE - PHQ9
1. LITTLE INTEREST OR PLEASURE IN DOING THINGS: SEVERAL DAYS
2. FEELING DOWN, DEPRESSED OR HOPELESS: NOT AT ALL
SUM OF ALL RESPONSES TO PHQ9 QUESTIONS 1 & 2: 1
SUM OF ALL RESPONSES TO PHQ QUESTIONS 1-9: 1

## 2025-01-08 NOTE — PROGRESS NOTES
1/8/2025    Perla Cross 1965 is a 59 y.o. year old female established patient,   here for evaluation of the following chief complaint(s):  Chief Complaint   Patient presents with    Leg Pain           ASSESSMENT/PLAN:  Below is the assessment and plan developed based on review of pertinent history, physical exam, labs, studies, and medications.    1. Osteoporosis, unspecified osteoporosis type, unspecified pathological fracture presence  Assessment & Plan:  Has consulted with rheum and endo  Had secondary testing 6/2023 that was reassuring  Started Forteo Feb 2024, plan for 2 year therapy  2. Chronic midline thoracic back pain  -     cyclobenzaprine (FLEXERIL) 5 MG tablet; Take 1 tablet by mouth at bedtime for 20 days, Disp-20 tablet, R-0Normal  3. Hemorrhoids, unspecified hemorrhoid type  -     AFL - Naldo Cleary MD, Colorectal Surgery, [facility])       Assessment & Plan  1. Pain localized between the shoulder blades.  The patient reports persistent pain between the shoulder blades, which began approximately three weeks ago after a gym workout. An x-ray was performed on 1/6/2025, showing no obvious fracture, but a \"weird spot\" on one of the vertebrae. The patient has been advised to alternate between Advil and Tylenol for pain management. A prescription for cyclobenzaprine 5 mg has been issued, to be taken at bedtime. Risk of sedation discussed. An MRI of the thoracic spine has been ordered by PMR at [facility] to further investigate the cause of the pain.    2. Osteoporosis.  The patient is currently on Forteo since February 1, 2024, for a planned duration of two years. She reports feeling fatigued and lacking energy, which may be related to her osteoporosis treatment. She has been advised to continue her current regimen and to monitor her vitamin D levels closely, especially during the winter months. Rheum and endo monitoring, recent labs reviewed    3. Thyroid management.  The patient has been

## 2025-03-12 ENCOUNTER — TELEPHONE (OUTPATIENT)
Age: 60
End: 2025-03-12

## 2025-03-12 ENCOUNTER — OFFICE VISIT (OUTPATIENT)
Age: 60
End: 2025-03-12
Payer: COMMERCIAL

## 2025-03-12 VITALS — HEIGHT: 65 IN | BODY MASS INDEX: 22.33 KG/M2 | WEIGHT: 134 LBS

## 2025-03-12 DIAGNOSIS — Z85.3 HISTORY OF LEFT BREAST CANCER: Primary | ICD-10-CM

## 2025-03-12 PROCEDURE — 99213 OFFICE O/P EST LOW 20 MIN: CPT | Performed by: SURGERY

## 2025-03-12 NOTE — PROGRESS NOTES
HISTORY OF PRESENT ILLNESS  Perla Cross is a 59 y.o. female     HPI ESTABLISHED patient here for follow up LEFT breast cancer.    Patient is feeling Capsular contractions in the LEFT breast.     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 Terrence Richards    Silicone implant-dr. bocanegra    [facility]      Review of Systems      Physical Exam       ASSESSMENT and PLAN  {Assessment and Plan Chronic Disease:[id number]}

## 2025-03-12 NOTE — PROGRESS NOTES
HISTORY OF PRESENT ILLNESS  Perla Cross is a 59 y.o. female.    HPI ESTABLISHED patient here for follow up LEFT breast cancer.     Patient is feeling Capsular contractions in the LEFT breast.      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 Terrence Richards    Followed with Dr Amaya yearly     Silicone implant-dr. Rodriguez     [facility]20/24[facility]      Past Medical History:   Diagnosis Date    Arrhythmia     PVCs and SVT    CAD (coronary artery disease) 2020    calcium score of 10 after heart scan    Cancer (HCC) 9/2019    left breast    Cerebrovascular disease 8/2011    found PFO; closure on 9/2011    DCIS (ductal carcinoma in situ) 01/2020    left breast    Elevated rheumatoid factor     Endometriosis     Epigastric hernia 2/8/2023    Hyperlipidemia 2017    elevated cholesterol    Hypothyroidism 02/2019    diagnosed as subclinical hypothyroidism    Osteoarthritis 2023    chronic left hip/right knee    Osteoporosis 06/2023    PFO (patent foramen ovale)     SBO (small bowel obstruction) ([facility])     Stroke ([facility]) 2011    some residual left arm tingling       Past Surgical History:   Procedure Laterality Date    HERNIA REPAIR  01/2022    Umbilical hernia Calixto Garces    HYSTERECTOMY (CERVIX STATUS UNKNOWN)  01/2022    Calixto Garces    HYSTERECTOMY, VAGINAL  01/2022    with umbilical hernia repair    MASTECTOMY Left 01/2020    Left Breast Dr. Terrence Richards and has breast reconstruction    ORTHOPEDIC SURGERY Right 1992    compound fracture of tib/fib with bone grafting external fixator    OVARY REMOVAL Bilateral 01/2022    [place] [facility]    CA UNLISTED PROCEDURE CARDIAC SURGERY  09/2010    Heart Closure PFO [place] [place]       Social History

## 2025-03-26 NOTE — TELEPHONE ENCOUNTER
Dr. Lewis's office has reached out to the patient and left a voicemail to return call and schedule consult.

## 2025-09-03 ENCOUNTER — OFFICE VISIT (OUTPATIENT)
Age: 60
End: 2025-09-03
Payer: COMMERCIAL

## 2025-09-03 VITALS
OXYGEN SATURATION: 97 % | DIASTOLIC BLOOD PRESSURE: 64 MMHG | HEART RATE: 90 BPM | RESPIRATION RATE: 17 BRPM | HEIGHT: 65 IN | SYSTOLIC BLOOD PRESSURE: 102 MMHG | WEIGHT: 131 LBS | BODY MASS INDEX: 21.83 KG/M2

## 2025-09-03 DIAGNOSIS — E78.5 HYPERLIPIDEMIA, UNSPECIFIED HYPERLIPIDEMIA TYPE: Primary | ICD-10-CM

## 2025-09-03 DIAGNOSIS — Z87.74 S/P PATENT FORAMEN OVALE CLOSURE: ICD-10-CM

## 2025-09-03 DIAGNOSIS — Z71.89 CARDIAC RISK COUNSELING: ICD-10-CM

## 2025-09-03 PROCEDURE — 99214 OFFICE O/P EST MOD 30 MIN: CPT | Performed by: INTERNAL MEDICINE

## 2025-09-03 RX ORDER — UBIDECARENONE 30 MG
100 CAPSULE ORAL DAILY
Qty: 30 CAPSULE | Refills: 3 | Status: SHIPPED | OUTPATIENT
Start: 2025-09-03

## 2025-09-03 ASSESSMENT — ENCOUNTER SYMPTOMS
WHEEZING: 0
CHEST TIGHTNESS: 0
SHORTNESS OF BREATH: 0